# Patient Record
Sex: FEMALE | Race: WHITE | NOT HISPANIC OR LATINO | Employment: FULL TIME | URBAN - METROPOLITAN AREA
[De-identification: names, ages, dates, MRNs, and addresses within clinical notes are randomized per-mention and may not be internally consistent; named-entity substitution may affect disease eponyms.]

---

## 2024-03-04 ENCOUNTER — TELEMEDICINE (OUTPATIENT)
Dept: FAMILY MEDICINE CLINIC | Facility: TELEHEALTH | Age: 24
End: 2024-03-04
Payer: COMMERCIAL

## 2024-03-04 DIAGNOSIS — J06.9 ACUTE URI: Primary | ICD-10-CM

## 2024-03-04 PROCEDURE — 99213 OFFICE O/P EST LOW 20 MIN: CPT | Performed by: NURSE PRACTITIONER

## 2024-03-04 RX ORDER — PSEUDOEPHEDRINE HCL 30 MG
60 TABLET ORAL EVERY 6 HOURS PRN
Qty: 30 TABLET | Refills: 0 | Status: SHIPPED | OUTPATIENT
Start: 2024-03-04 | End: 2024-03-09

## 2024-03-04 RX ORDER — AZITHROMYCIN 250 MG/1
TABLET, FILM COATED ORAL
Qty: 6 TABLET | Refills: 0 | Status: SHIPPED | OUTPATIENT
Start: 2024-03-04

## 2024-03-05 NOTE — PROGRESS NOTES
You have chosen to receive care through a telehealth visit.  Do you consent to use a video/audio connection for your medical care today? Yes     CHIEF COMPLAINT  Chief Complaint   Patient presents with    URI         HPI  Zach Escudero is a 23 y.o. female  presents with complaint of green nasal mucus with post nasal drip that started today.    Review of Systems   HENT:  Positive for congestion and postnasal drip.    All other systems reviewed and are negative.      History reviewed. No pertinent past medical history.    History reviewed. No pertinent family history.    Social History     Socioeconomic History    Marital status: Single       Zach Escudero  has no history on file for tobacco use..     There were no vitals taken for this visit.    PHYSICAL EXAM  Physical Exam   Constitutional: She appears well-developed and well-nourished.   HENT:   Head: Normocephalic.   Eyes: Pupils are equal, round, and reactive to light.   Pulmonary/Chest: Effort normal.   Musculoskeletal: Normal range of motion.   Neurological: She is alert.   Psychiatric: She has a normal mood and affect.       No results found for this or any previous visit.    Diagnoses and all orders for this visit:    1. Acute URI (Primary)  -     pseudoephedrine (Sudafed) 30 MG tablet; Take 2 tablets by mouth Every 6 (Six) Hours As Needed for Congestion for up to 5 days.  Dispense: 30 tablet; Refill: 0  -     azithromycin (Zithromax Z-Merritt) 250 MG tablet; Take 2 tablets by mouth on day 1, then 1 tablet daily on days 2-5  Dispense: 6 tablet; Refill: 0          FOLLOW-UP  As discussed during visit with PCP/[facility] if no improvement or Urgent Care/Emergency Department if worsening of symptoms    Patient verbalizes understanding of medication dosage, comfort measures, instructions for treatment and follow-up.    Lucero Vásquez, APRN  03/04/2024  22:08 EST    The use of a video visit has been reviewed with the patient and verbal informed consent  has been obtained. Myself and Zach Escudero participated in this visit. The patient is located in [address] [place] [address] [address] [facility], [place].    I am located in [place], [place]. Mychart and Twilio were utilized. I spent 10 minutes in the patient's chart for this visit.      Note Disclaimer: At [facility], we believe that sharing information builds trust and better   relationships. You are receiving this note because you recently visited [facility]. It is possible you   will see health information before a provider has talked with you about it. This kind of information can   be easy to misunderstand. To help you fully understand what it means for your health, we urge you to   discuss this note with your provider.

## 2024-07-10 ENCOUNTER — TELEMEDICINE (OUTPATIENT)
Dept: FAMILY MEDICINE CLINIC | Facility: TELEHEALTH | Age: 24
End: 2024-07-10
Payer: COMMERCIAL

## 2024-07-10 DIAGNOSIS — H69.92 EUSTACHIAN TUBE DYSFUNCTION, LEFT: Primary | ICD-10-CM

## 2024-07-10 DIAGNOSIS — H66.92 LEFT OTITIS MEDIA, UNSPECIFIED OTITIS MEDIA TYPE: ICD-10-CM

## 2024-07-10 PROCEDURE — 99213 OFFICE O/P EST LOW 20 MIN: CPT | Performed by: NURSE PRACTITIONER

## 2024-07-10 RX ORDER — PSEUDOEPHEDRINE HCL 30 MG
30 TABLET ORAL EVERY 4 HOURS PRN
Qty: 48 TABLET | Refills: 0 | Status: SHIPPED | OUTPATIENT
Start: 2024-07-10 | End: 2024-07-24

## 2024-07-10 RX ORDER — ACETAMINOPHEN AND CODEINE PHOSPHATE 120; 12 MG/5ML; MG/5ML
SOLUTION ORAL
COMMUNITY
Start: 2024-01-01

## 2024-07-10 RX ORDER — CETIRIZINE HYDROCHLORIDE 10 MG/1
TABLET ORAL
COMMUNITY

## 2024-07-10 RX ORDER — FLUDROCORTISONE ACETATE 0.1 MG/1
TABLET ORAL
COMMUNITY
Start: 2024-06-12

## 2024-07-10 RX ORDER — NAPROXEN 500 MG/1
TABLET ORAL EVERY 12 HOURS SCHEDULED
COMMUNITY

## 2024-07-10 RX ORDER — FLUTICASONE PROPIONATE 50 MCG
2 SPRAY, SUSPENSION (ML) NASAL DAILY
Qty: 16 G | Refills: 0 | Status: SHIPPED | OUTPATIENT
Start: 2024-07-10 | End: 2024-08-09

## 2024-07-10 NOTE — PROGRESS NOTES
Chief Complaint   Patient presents with    Earache    Nasal Congestion       Video Visit Reason:   Free Text Description: I have fluid in my ear from what I think is a sinus infection  Subjective   Zach Escudero is a 23 y.o. female.     History of Present Illness  Sore throat, fatigue, cough and congestion starting on Saturday with improvement of symptoms on Monday but ear pain started worsening. She says that it feels like there is fluid in the left ear.   Earache   There is pain in the left ear. This is a new problem. Episode onset: 2 days. The problem occurs constantly. There has been no fever. The pain is mild. Associated symptoms include headaches, rhinorrhea and a sore throat. Pertinent negatives include no ear discharge or hearing loss. She has tried acetaminophen for the symptoms. The treatment provided no relief.       The following portions of the patient's history were reviewed and updated as appropriate: allergies, current medications, past medical history, and problem list.      History reviewed. No pertinent past medical history.  Social History     Socioeconomic History    Marital status: Single   Tobacco Use    Smoking status: Never    Smokeless tobacco: Never     medication documentation: reviewed and updated with patient and   Current Outpatient Medications:     fludrocortisone 0.1 MG tablet, , Disp: , Rfl:     norethindrone (MICRONOR) 0.35 MG tablet, , Disp: , Rfl:     cetirizine (ZyrTEC Allergy) 10 MG tablet, ZyrTEC, Disp: , Rfl:     fluticasone (FLONASE) 50 MCG/ACT nasal spray, 2 sprays into the nostril(s) as directed by provider Daily for 30 days. Administer 2 sprays in each nostril for each dose., Disp: 16 g, Rfl: 0    naproxen (NAPROSYN) 500 MG tablet, Every 12 (Twelve) Hours., Disp: , Rfl:     pseudoephedrine (Sudafed) 30 MG tablet, Take 1 tablet by mouth Every 4 (Four) Hours As Needed for Congestion for up to 14 days., Disp: 48 tablet, Rfl: 0  Review of Systems   Constitutional:   Positive for fatigue. Negative for chills and fever.   HENT:  Positive for congestion, ear pain, postnasal drip, rhinorrhea, sore throat and voice change. Negative for ear discharge, facial swelling, hearing loss, sinus pressure and sinus pain.    Respiratory:  Negative for shortness of breath and wheezing.    Allergic/Immunologic: Positive for environmental allergies.   Neurological:  Positive for headaches. Negative for dizziness.   Hematological:  Negative for adenopathy.       Objective   Physical Exam  HENT:      Left Ear: No tenderness.      Nose: Congestion present.      Mouth/Throat:      Pharynx: Posterior oropharyngeal erythema present.   Eyes:      Conjunctiva/sclera: Conjunctivae normal.   Pulmonary:      Effort: Pulmonary effort is normal.   Neurological:      Mental Status: She is alert.   Psychiatric:         Mood and Affect: Mood normal.         Assessment & Plan   Diagnoses and all orders for this visit:    1. Eustachian tube dysfunction, left (Primary)  -     fluticasone (FLONASE) 50 MCG/ACT nasal spray; 2 sprays into the nostril(s) as directed by provider Daily for 30 days. Administer 2 sprays in each nostril for each dose.  Dispense: 16 g; Refill: 0  -     pseudoephedrine (Sudafed) 30 MG tablet; Take 1 tablet by mouth Every 4 (Four) Hours As Needed for Congestion for up to 14 days.  Dispense: 48 tablet; Refill: 0    2. Left otitis media, unspecified otitis media type  -     fluticasone (FLONASE) 50 MCG/ACT nasal spray; 2 sprays into the nostril(s) as directed by provider Daily for 30 days. Administer 2 sprays in each nostril for each dose.  Dispense: 16 g; Refill: 0  -     pseudoephedrine (Sudafed) 30 MG tablet; Take 1 tablet by mouth Every 4 (Four) Hours As Needed for Congestion for up to 14 days.  Dispense: 48 tablet; Refill: 0                    Follow Up:  If your symptoms are not resolving by the completion of your treatment or are worsening, see your primary care provider for follow up. If  you don't have a primary care provider, you may go to any Urgent Care for re-evaluation. If you develop any life threatening symptoms, go to the nearest Emergency Department immediately or call EMS.               The use of  Video Visit was utilized during this visit, using both [employer] and [employer]. The use of a video visit has been reviewed with the patient and verbal informed consent has been obtained. No technical difficulties occurred during the visit.    is located at [address] [place] [address] [address] [place] [address]  Provider is located at [place], [place]

## 2024-07-10 NOTE — PATIENT INSTRUCTIONS
Eustachian Tube Dysfunction    Eustachian tube dysfunction refers to a condition in which a blockage develops in the narrow passage that connects the middle ear to the back of the nose (eustachian tube). The eustachian tube regulates air pressure in the middle ear by letting air move between the ear and nose. It also helps to drain fluid from the middle ear space.  Eustachian tube dysfunction can affect one or both ears. When the eustachian tube does not function properly, air pressure, fluid, or both can build up in the middle ear.  What are the causes?  This condition occurs when the eustachian tube becomes blocked or cannot open normally. Common causes of this condition include:  Ear infections.  Colds and other infections that affect the nose, mouth, and throat (upper respiratory tract).  Allergies.  Irritation from cigarette smoke.  Irritation from stomach acid coming up into the esophagus (gastroesophageal reflux). The esophagus is the part of the body that moves food from the mouth to the stomach.  Sudden changes in air pressure, such as from descending in an airplane or scuba diving.  Abnormal growths in the nose or throat, such as:  Growths that line the nose (nasal polyps).  Abnormal growth of cells (tumors).  Enlarged tissue at the back of the throat (adenoids).  What increases the risk?  You are more likely to develop this condition if:  You smoke.  You are overweight.  You are a child who has:  Certain birth defects of the mouth, such as cleft palate.  Large tonsils or adenoids.  What are the signs or symptoms?  Common symptoms of this condition include:  A feeling of fullness in the ear.  Ear pain.  Clicking or popping noises in the ear.  Ringing in the ear (tinnitus).  Hearing loss.  Loss of balance.  Dizziness.  Symptoms may get worse when the air pressure around you changes, such as when you travel to an area of high elevation, fly on an airplane, or go scuba diving.  How is this diagnosed?  This  "condition may be diagnosed based on:  Your symptoms.  A physical exam of your ears, nose, and throat.  Tests, such as those that measure:  The movement of your eardrum.  Your hearing (audiometry).  How is this treated?  Treatment depends on the cause and severity of your condition.  In mild cases, you may relieve your symptoms by moving air into your ears. This is called \"popping the ears.\"  In more severe cases, or if you have symptoms of fluid in your ears, treatment may include:  Medicines to relieve congestion (decongestants).  Medicines that treat allergies (antihistamines).  Nasal sprays or ear drops that contain medicines that reduce swelling (steroids).  A procedure to drain the fluid in your eardrum. In this procedure, a small tube may be placed in the eardrum to:  Drain the fluid.  Restore the air in the middle ear space.  A procedure to insert a balloon device through the nose to inflate the opening of the eustachian tube (balloon dilation).  Follow these instructions at home:  Lifestyle  Do not do any of the following until your health care provider approves:  Travel to high altitudes.  Fly in airplanes.  Work in a pressurized cabin or room.  Scuba dive.  Do not use any products that contain nicotine or tobacco. These products include cigarettes, chewing tobacco, and vaping devices, such as e-cigarettes. If you need help quitting, ask your health care provider.  Keep your ears dry. Wear fitted earplugs during showering and bathing. Dry your ears completely after.  General instructions  Take over-the-counter and prescription medicines only as told by your health care provider.  Use techniques to help pop your ears as recommended by your health care provider. These may include:  Chewing gum.  Yawning.  Frequent, forceful swallowing.  Closing your mouth, holding your nose closed, and gently blowing as if you are trying to blow air out of your nose.  Keep all follow-up visits. This is important.  Contact a " health care provider if:  Your symptoms do not go away after treatment.  Your symptoms come back after treatment.  You are unable to pop your ears.  You have:  A fever.  Pain in your ear.  Pain in your head or neck.  Fluid draining from your ear.  Your hearing suddenly changes.  You become very dizzy.  You lose your balance.  Get help right away if:  You have a sudden, severe increase in any of your symptoms.  Summary  Eustachian tube dysfunction refers to a condition in which a blockage develops in the eustachian tube.  It can be caused by ear infections, allergies, inhaled irritants, or abnormal growths in the nose or throat.  Symptoms may include ear pain or fullness, hearing loss, or ringing in the ears.  Mild cases are treated with techniques to unblock the ears, such as yawning or chewing gum.  More severe cases are treated with medicines or procedures.  This information is not intended to replace advice given to you by your health care provider. Make sure you discuss any questions you have with your health care provider.  Document Revised: 02/28/2022 Document Reviewed: 02/28/2022  Elsevier Patient Education © 2024 [employer]